# Patient Record
Sex: MALE | Race: WHITE | NOT HISPANIC OR LATINO
[De-identification: names, ages, dates, MRNs, and addresses within clinical notes are randomized per-mention and may not be internally consistent; named-entity substitution may affect disease eponyms.]

---

## 2017-08-13 PROBLEM — Z00.00 ENCOUNTER FOR PREVENTIVE HEALTH EXAMINATION: Status: ACTIVE | Noted: 2017-08-13

## 2017-08-17 ENCOUNTER — APPOINTMENT (OUTPATIENT)
Dept: VASCULAR SURGERY | Facility: CLINIC | Age: 72
End: 2017-08-17
Payer: COMMERCIAL

## 2017-08-17 VITALS
BODY MASS INDEX: 22.02 KG/M2 | TEMPERATURE: 97.5 F | HEIGHT: 66 IN | RESPIRATION RATE: 15 BRPM | OXYGEN SATURATION: 97 % | HEART RATE: 67 BPM | SYSTOLIC BLOOD PRESSURE: 158 MMHG | WEIGHT: 137 LBS | DIASTOLIC BLOOD PRESSURE: 88 MMHG

## 2017-08-17 DIAGNOSIS — I71.4 ABDOMINAL AORTIC ANEURYSM, W/OUT RUPTURE: ICD-10-CM

## 2017-08-17 DIAGNOSIS — Z86.79 PERSONAL HISTORY OF OTHER DISEASES OF THE CIRCULATORY SYSTEM: ICD-10-CM

## 2017-08-17 PROCEDURE — 99243 OFF/OP CNSLTJ NEW/EST LOW 30: CPT | Mod: 25

## 2017-08-17 PROCEDURE — 93923 UPR/LXTR ART STDY 3+ LVLS: CPT

## 2017-08-18 PROBLEM — I71.4 ABDOMINAL AORTIC ANEURYSM (AAA) WITHOUT RUPTURE: Status: ACTIVE | Noted: 2017-08-18

## 2017-08-18 RX ORDER — TAMSULOSIN HYDROCHLORIDE 0.4 MG/1
0.4 CAPSULE ORAL
Refills: 0 | Status: ACTIVE | COMMUNITY

## 2017-08-18 RX ORDER — ALLOPURINOL 300 MG/1
300 TABLET ORAL
Refills: 0 | Status: ACTIVE | COMMUNITY

## 2017-08-18 RX ORDER — METOPROLOL SUCCINATE 25 MG/1
25 TABLET, EXTENDED RELEASE ORAL
Refills: 0 | Status: ACTIVE | COMMUNITY

## 2017-08-18 RX ORDER — ASPIRIN ENTERIC COATED TABLETS 81 MG 81 MG/1
81 TABLET, DELAYED RELEASE ORAL
Refills: 0 | Status: ACTIVE | COMMUNITY

## 2017-08-18 RX ORDER — LISINOPRIL 5 MG/1
5 TABLET ORAL
Refills: 0 | Status: ACTIVE | COMMUNITY

## 2017-08-25 ENCOUNTER — OUTPATIENT (OUTPATIENT)
Dept: OUTPATIENT SERVICES | Facility: HOSPITAL | Age: 72
LOS: 1 days | End: 2017-08-25
Payer: COMMERCIAL

## 2017-08-25 VITALS
SYSTOLIC BLOOD PRESSURE: 157 MMHG | HEIGHT: 66 IN | DIASTOLIC BLOOD PRESSURE: 84 MMHG | RESPIRATION RATE: 16 BRPM | HEART RATE: 60 BPM | WEIGHT: 137.79 LBS | TEMPERATURE: 98 F

## 2017-08-25 DIAGNOSIS — Z01.818 ENCOUNTER FOR OTHER PREPROCEDURAL EXAMINATION: ICD-10-CM

## 2017-08-25 DIAGNOSIS — I25.10 ATHEROSCLEROTIC HEART DISEASE OF NATIVE CORONARY ARTERY WITHOUT ANGINA PECTORIS: ICD-10-CM

## 2017-08-25 DIAGNOSIS — Z98.890 OTHER SPECIFIED POSTPROCEDURAL STATES: Chronic | ICD-10-CM

## 2017-08-25 DIAGNOSIS — Z95.1 PRESENCE OF AORTOCORONARY BYPASS GRAFT: Chronic | ICD-10-CM

## 2017-08-25 DIAGNOSIS — I73.9 PERIPHERAL VASCULAR DISEASE, UNSPECIFIED: ICD-10-CM

## 2017-08-25 DIAGNOSIS — I10 ESSENTIAL (PRIMARY) HYPERTENSION: ICD-10-CM

## 2017-08-25 LAB
ANION GAP SERPL CALC-SCNC: 14 MMOL/L — SIGNIFICANT CHANGE UP (ref 5–17)
APTT BLD: 31.1 SEC — SIGNIFICANT CHANGE UP (ref 27.5–37.4)
BASOPHILS # BLD AUTO: 0 K/UL — SIGNIFICANT CHANGE UP (ref 0–0.2)
BASOPHILS NFR BLD AUTO: 0.3 % — SIGNIFICANT CHANGE UP (ref 0–2)
BLD GP AB SCN SERPL QL: SIGNIFICANT CHANGE UP
BUN SERPL-MCNC: 12 MG/DL — SIGNIFICANT CHANGE UP (ref 8–20)
CALCIUM SERPL-MCNC: 9.8 MG/DL — SIGNIFICANT CHANGE UP (ref 8.6–10.2)
CHLORIDE SERPL-SCNC: 102 MMOL/L — SIGNIFICANT CHANGE UP (ref 98–107)
CO2 SERPL-SCNC: 26 MMOL/L — SIGNIFICANT CHANGE UP (ref 22–29)
CREAT SERPL-MCNC: 0.88 MG/DL — SIGNIFICANT CHANGE UP (ref 0.5–1.3)
EOSINOPHIL # BLD AUTO: 0.1 K/UL — SIGNIFICANT CHANGE UP (ref 0–0.5)
EOSINOPHIL NFR BLD AUTO: 1.3 % — SIGNIFICANT CHANGE UP (ref 0–5)
GLUCOSE SERPL-MCNC: 93 MG/DL — SIGNIFICANT CHANGE UP (ref 70–115)
HCT VFR BLD CALC: 41.2 % — LOW (ref 42–52)
HGB BLD-MCNC: 13.4 G/DL — LOW (ref 14–18)
INR BLD: 1.07 RATIO — SIGNIFICANT CHANGE UP (ref 0.88–1.16)
LYMPHOCYTES # BLD AUTO: 2.4 K/UL — SIGNIFICANT CHANGE UP (ref 1–4.8)
LYMPHOCYTES # BLD AUTO: 26.6 % — SIGNIFICANT CHANGE UP (ref 20–55)
MCHC RBC-ENTMCNC: 31.6 PG — HIGH (ref 27–31)
MCHC RBC-ENTMCNC: 32.5 G/DL — SIGNIFICANT CHANGE UP (ref 32–36)
MCV RBC AUTO: 97.2 FL — HIGH (ref 80–94)
MONOCYTES # BLD AUTO: 0.6 K/UL — SIGNIFICANT CHANGE UP (ref 0–0.8)
MONOCYTES NFR BLD AUTO: 7.2 % — SIGNIFICANT CHANGE UP (ref 3–10)
NEUTROPHILS # BLD AUTO: 5.8 K/UL — SIGNIFICANT CHANGE UP (ref 1.8–8)
NEUTROPHILS NFR BLD AUTO: 64.4 % — SIGNIFICANT CHANGE UP (ref 37–73)
PLATELET # BLD AUTO: 267 K/UL — SIGNIFICANT CHANGE UP (ref 150–400)
POTASSIUM SERPL-MCNC: 5 MMOL/L — SIGNIFICANT CHANGE UP (ref 3.5–5.3)
POTASSIUM SERPL-SCNC: 5 MMOL/L — SIGNIFICANT CHANGE UP (ref 3.5–5.3)
PROTHROM AB SERPL-ACNC: 11.8 SEC — SIGNIFICANT CHANGE UP (ref 9.8–12.7)
RBC # BLD: 4.24 M/UL — LOW (ref 4.6–6.2)
RBC # FLD: 15.1 % — SIGNIFICANT CHANGE UP (ref 11–15.6)
SODIUM SERPL-SCNC: 142 MMOL/L — SIGNIFICANT CHANGE UP (ref 135–145)
TYPE + AB SCN PNL BLD: SIGNIFICANT CHANGE UP
WBC # BLD: 9 K/UL — SIGNIFICANT CHANGE UP (ref 4.8–10.8)
WBC # FLD AUTO: 9 K/UL — SIGNIFICANT CHANGE UP (ref 4.8–10.8)

## 2017-08-25 PROCEDURE — 85610 PROTHROMBIN TIME: CPT

## 2017-08-25 PROCEDURE — 86900 BLOOD TYPING SEROLOGIC ABO: CPT

## 2017-08-25 PROCEDURE — 80048 BASIC METABOLIC PNL TOTAL CA: CPT

## 2017-08-25 PROCEDURE — 86850 RBC ANTIBODY SCREEN: CPT

## 2017-08-25 PROCEDURE — 86901 BLOOD TYPING SEROLOGIC RH(D): CPT

## 2017-08-25 PROCEDURE — 85027 COMPLETE CBC AUTOMATED: CPT

## 2017-08-25 PROCEDURE — 85730 THROMBOPLASTIN TIME PARTIAL: CPT

## 2017-08-25 PROCEDURE — 36415 COLL VENOUS BLD VENIPUNCTURE: CPT

## 2017-08-25 RX ORDER — LISINOPRIL 2.5 MG/1
1 TABLET ORAL
Qty: 0 | Refills: 0 | COMMUNITY

## 2017-08-25 RX ORDER — SODIUM CHLORIDE 9 MG/ML
3 INJECTION INTRAMUSCULAR; INTRAVENOUS; SUBCUTANEOUS EVERY 8 HOURS
Qty: 0 | Refills: 0 | Status: DISCONTINUED | OUTPATIENT
Start: 2017-09-20 | End: 2017-09-20

## 2017-08-25 RX ORDER — CEFAZOLIN SODIUM 1 G
2000 VIAL (EA) INJECTION ONCE
Qty: 0 | Refills: 0 | Status: DISCONTINUED | OUTPATIENT
Start: 2017-09-20 | End: 2017-09-21

## 2017-08-25 NOTE — H&P PST ADULT - FAMILY HISTORY
Sibling  Still living? Yes, Estimated age: Age Unknown  Family history of diabetes mellitus, Age at diagnosis: Age Unknown     Sibling  Still living? No  Family history of emphysema, Age at diagnosis: 61-70     Mother  Still living? No  Family history of lung cancer, Age at diagnosis: Age Unknown

## 2017-08-25 NOTE — H&P PST ADULT - PROBLEM SELECTOR PLAN 2
Medical clearance pending. Take Metoprolol am day of surgery with small sip of water. Takes Lisinopril at night, take as normally would night before surgery.

## 2017-08-25 NOTE — H&P PST ADULT - PSH
History of lithotripsy    History of shoulder surgery  Left  S/P AAA repair    S/P CABG x 3  1990's at [facility]

## 2017-08-25 NOTE — H&P PST ADULT - PMH
BPH (benign prostatic hyperplasia)    Coronary artery disease    Hyperlipidemia    Hypertension    PVD (peripheral vascular disease)    Renal calculi AAA (abdominal aortic aneurysm)  s/p repair 2009  BPH (benign prostatic hyperplasia)    Coronary artery disease    Hyperlipidemia    Hypertension    PVD (peripheral vascular disease)    Renal calculi

## 2017-08-25 NOTE — H&P PST ADULT - NSANTHOSAYNRD_GEN_A_CORE
No. LATOYA screening performed.  STOP BANG Legend: 0-2 = LOW Risk; 3-4 = INTERMEDIATE Risk; 5-8 = HIGH Risk

## 2017-08-25 NOTE — H&P PST ADULT - PROBLEM SELECTOR PLAN 3
Cardiac clearance on chart. Hold Aspirin 5 days prior to surgery as ok with Dr. Bardales. Cardiac clearance on chart. Aspirin ok to be held for 5 days prior to surgery by cardiology, however Dr. Rodas said "ok to continue." Discussed with patient.

## 2017-08-25 NOTE — H&P PST ADULT - HISTORY OF PRESENT ILLNESS
71M with pain that starts in hip and radiates down left leg with numbness to toes and tingling down legs and weakness. Within 5 minutes of rest feeling comes back and tingling goes away. Patient reports this has been occurring for 2-3 months. For femoral femoral bypass.

## 2017-09-20 ENCOUNTER — INPATIENT (INPATIENT)
Facility: HOSPITAL | Age: 72
LOS: 0 days | Discharge: HOME | DRG: 253 | End: 2017-09-21
Attending: SURGERY | Admitting: SURGERY
Payer: COMMERCIAL

## 2017-09-20 VITALS
HEART RATE: 58 BPM | WEIGHT: 145.06 LBS | RESPIRATION RATE: 16 BRPM | HEIGHT: 66 IN | DIASTOLIC BLOOD PRESSURE: 70 MMHG | TEMPERATURE: 98 F | SYSTOLIC BLOOD PRESSURE: 158 MMHG | OXYGEN SATURATION: 99 %

## 2017-09-20 DIAGNOSIS — Z98.890 OTHER SPECIFIED POSTPROCEDURAL STATES: Chronic | ICD-10-CM

## 2017-09-20 DIAGNOSIS — Z95.1 PRESENCE OF AORTOCORONARY BYPASS GRAFT: Chronic | ICD-10-CM

## 2017-09-20 DIAGNOSIS — I73.9 PERIPHERAL VASCULAR DISEASE, UNSPECIFIED: ICD-10-CM

## 2017-09-20 LAB
BLD GP AB SCN SERPL QL: SIGNIFICANT CHANGE UP
TYPE + AB SCN PNL BLD: SIGNIFICANT CHANGE UP

## 2017-09-20 PROCEDURE — 35661 BPG FEMORAL-FEMORAL: CPT

## 2017-09-20 PROCEDURE — 93010 ELECTROCARDIOGRAM REPORT: CPT

## 2017-09-20 PROCEDURE — 35661 BPG FEMORAL-FEMORAL: CPT | Mod: AS

## 2017-09-20 RX ORDER — OXYCODONE HYDROCHLORIDE 5 MG/1
5 TABLET ORAL EVERY 6 HOURS
Qty: 0 | Refills: 0 | Status: DISCONTINUED | OUTPATIENT
Start: 2017-09-20 | End: 2017-09-21

## 2017-09-20 RX ORDER — ONDANSETRON 8 MG/1
4 TABLET, FILM COATED ORAL ONCE
Qty: 0 | Refills: 0 | Status: DISCONTINUED | OUTPATIENT
Start: 2017-09-20 | End: 2017-09-20

## 2017-09-20 RX ORDER — ALLOPURINOL 300 MG
300 TABLET ORAL DAILY
Qty: 0 | Refills: 0 | Status: DISCONTINUED | OUTPATIENT
Start: 2017-09-20 | End: 2017-09-21

## 2017-09-20 RX ORDER — LISINOPRIL 2.5 MG/1
5 TABLET ORAL DAILY
Qty: 0 | Refills: 0 | Status: DISCONTINUED | OUTPATIENT
Start: 2017-09-20 | End: 2017-09-21

## 2017-09-20 RX ORDER — ASPIRIN/CALCIUM CARB/MAGNESIUM 324 MG
81 TABLET ORAL
Qty: 0 | Refills: 0 | Status: DISCONTINUED | OUTPATIENT
Start: 2017-09-20 | End: 2017-09-21

## 2017-09-20 RX ORDER — ENOXAPARIN SODIUM 100 MG/ML
40 INJECTION SUBCUTANEOUS DAILY
Qty: 0 | Refills: 0 | Status: DISCONTINUED | OUTPATIENT
Start: 2017-09-20 | End: 2017-09-20

## 2017-09-20 RX ORDER — ALLOPURINOL 300 MG
1 TABLET ORAL
Qty: 0 | Refills: 0 | COMMUNITY

## 2017-09-20 RX ORDER — TAMSULOSIN HYDROCHLORIDE 0.4 MG/1
0.4 CAPSULE ORAL AT BEDTIME
Qty: 0 | Refills: 0 | Status: DISCONTINUED | OUTPATIENT
Start: 2017-09-20 | End: 2017-09-21

## 2017-09-20 RX ORDER — ASPIRIN/CALCIUM CARB/MAGNESIUM 324 MG
81 TABLET ORAL
Qty: 0 | Refills: 0 | Status: DISCONTINUED | OUTPATIENT
Start: 2017-09-20 | End: 2017-09-20

## 2017-09-20 RX ORDER — METOPROLOL TARTRATE 50 MG
25 TABLET ORAL DAILY
Qty: 0 | Refills: 0 | Status: DISCONTINUED | OUTPATIENT
Start: 2017-09-20 | End: 2017-09-21

## 2017-09-20 RX ORDER — MORPHINE SULFATE 50 MG/1
4 CAPSULE, EXTENDED RELEASE ORAL EVERY 4 HOURS
Qty: 0 | Refills: 0 | Status: DISCONTINUED | OUTPATIENT
Start: 2017-09-20 | End: 2017-09-21

## 2017-09-20 RX ORDER — SIMVASTATIN 20 MG/1
1 TABLET, FILM COATED ORAL
Qty: 0 | Refills: 0 | COMMUNITY

## 2017-09-20 RX ORDER — METOPROLOL TARTRATE 50 MG
25 TABLET ORAL DAILY
Qty: 0 | Refills: 0 | Status: DISCONTINUED | OUTPATIENT
Start: 2017-09-20 | End: 2017-09-20

## 2017-09-20 RX ORDER — SIMVASTATIN 20 MG/1
20 TABLET, FILM COATED ORAL AT BEDTIME
Qty: 0 | Refills: 0 | Status: DISCONTINUED | OUTPATIENT
Start: 2017-09-20 | End: 2017-09-21

## 2017-09-20 RX ORDER — ENOXAPARIN SODIUM 100 MG/ML
40 INJECTION SUBCUTANEOUS DAILY
Qty: 0 | Refills: 0 | Status: COMPLETED | OUTPATIENT
Start: 2017-09-21 | End: 2017-09-21

## 2017-09-20 RX ORDER — LISINOPRIL 2.5 MG/1
1 TABLET ORAL
Qty: 0 | Refills: 0 | COMMUNITY

## 2017-09-20 RX ORDER — SODIUM CHLORIDE 9 MG/ML
1000 INJECTION, SOLUTION INTRAVENOUS
Qty: 0 | Refills: 0 | Status: DISCONTINUED | OUTPATIENT
Start: 2017-09-20 | End: 2017-09-20

## 2017-09-20 RX ORDER — ASPIRIN/CALCIUM CARB/MAGNESIUM 324 MG
2 TABLET ORAL
Qty: 0 | Refills: 0 | COMMUNITY

## 2017-09-20 RX ORDER — TAMSULOSIN HYDROCHLORIDE 0.4 MG/1
1 CAPSULE ORAL
Qty: 0 | Refills: 0 | COMMUNITY

## 2017-09-20 RX ORDER — FENTANYL CITRATE 50 UG/ML
25 INJECTION INTRAVENOUS
Qty: 0 | Refills: 0 | Status: DISCONTINUED | OUTPATIENT
Start: 2017-09-20 | End: 2017-09-20

## 2017-09-20 RX ADMIN — FENTANYL CITRATE 25 MICROGRAM(S): 50 INJECTION INTRAVENOUS at 20:49

## 2017-09-20 RX ADMIN — SIMVASTATIN 20 MILLIGRAM(S): 20 TABLET, FILM COATED ORAL at 23:16

## 2017-09-20 RX ADMIN — FENTANYL CITRATE 25 MICROGRAM(S): 50 INJECTION INTRAVENOUS at 13:39

## 2017-09-20 RX ADMIN — FENTANYL CITRATE 25 MICROGRAM(S): 50 INJECTION INTRAVENOUS at 20:23

## 2017-09-20 RX ADMIN — TAMSULOSIN HYDROCHLORIDE 0.4 MILLIGRAM(S): 0.4 CAPSULE ORAL at 23:16

## 2017-09-20 RX ADMIN — SODIUM CHLORIDE 100 MILLILITER(S): 9 INJECTION, SOLUTION INTRAVENOUS at 20:24

## 2017-09-20 RX ADMIN — Medication 81 MILLIGRAM(S): at 23:16

## 2017-09-20 RX ADMIN — FENTANYL CITRATE 25 MICROGRAM(S): 50 INJECTION INTRAVENOUS at 20:20

## 2017-09-20 NOTE — PROGRESS NOTE ADULT - ASSESSMENT
71 year old male pod0 s/p Creation, bypass, arterial, femoral to popliteal, using ePTFE graft  - pain control prn  - EKG for bradycardia, monitor closely for symptomatic bradycardia, monitored bed  - monitor vital signs  - manning to stay for now  - vascular checks  - DVT ppx

## 2017-09-20 NOTE — BRIEF OPERATIVE NOTE - PROCEDURE
<<-----Click on this checkbox to enter Procedure Creation, bypass, arterial, femoral to popliteal, using ePTFE graft  09/20/2017  right to left  Active  SARINA Creation of bypass from femoral artery to contralateral femoral artery  09/21/2017  Right to left  Active  PDELOSSANT Creation of bypass from femoral artery to contralateral femoral artery  09/21/2017  Right to left  Active  RodasLiu Hall

## 2017-09-21 ENCOUNTER — TRANSCRIPTION ENCOUNTER (OUTPATIENT)
Age: 72
End: 2017-09-21

## 2017-09-21 VITALS
RESPIRATION RATE: 17 BRPM | TEMPERATURE: 98 F | DIASTOLIC BLOOD PRESSURE: 67 MMHG | OXYGEN SATURATION: 98 % | SYSTOLIC BLOOD PRESSURE: 114 MMHG | HEART RATE: 58 BPM

## 2017-09-21 DIAGNOSIS — I25.10 ATHEROSCLEROTIC HEART DISEASE OF NATIVE CORONARY ARTERY WITHOUT ANGINA PECTORIS: ICD-10-CM

## 2017-09-21 DIAGNOSIS — I10 ESSENTIAL (PRIMARY) HYPERTENSION: ICD-10-CM

## 2017-09-21 DIAGNOSIS — R00.1 BRADYCARDIA, UNSPECIFIED: ICD-10-CM

## 2017-09-21 LAB
ANION GAP SERPL CALC-SCNC: 9 MMOL/L — SIGNIFICANT CHANGE UP (ref 5–17)
APTT BLD: 29.8 SEC — SIGNIFICANT CHANGE UP (ref 27.5–37.4)
BASOPHILS # BLD AUTO: 0 K/UL — SIGNIFICANT CHANGE UP (ref 0–0.2)
BASOPHILS NFR BLD AUTO: 0.1 % — SIGNIFICANT CHANGE UP (ref 0–2)
BUN SERPL-MCNC: 17 MG/DL — SIGNIFICANT CHANGE UP (ref 8–20)
CALCIUM SERPL-MCNC: 8.5 MG/DL — LOW (ref 8.6–10.2)
CHLORIDE SERPL-SCNC: 106 MMOL/L — SIGNIFICANT CHANGE UP (ref 98–107)
CO2 SERPL-SCNC: 25 MMOL/L — SIGNIFICANT CHANGE UP (ref 22–29)
CREAT SERPL-MCNC: 0.87 MG/DL — SIGNIFICANT CHANGE UP (ref 0.5–1.3)
EOSINOPHIL # BLD AUTO: 0 K/UL — SIGNIFICANT CHANGE UP (ref 0–0.5)
EOSINOPHIL NFR BLD AUTO: 0 % — SIGNIFICANT CHANGE UP (ref 0–6)
GLUCOSE SERPL-MCNC: 133 MG/DL — HIGH (ref 70–115)
HCT VFR BLD CALC: 35.1 % — LOW (ref 42–52)
HGB BLD-MCNC: 11 G/DL — LOW (ref 14–18)
INR BLD: 1.16 RATIO — SIGNIFICANT CHANGE UP (ref 0.88–1.16)
LYMPHOCYTES # BLD AUTO: 1.2 K/UL — SIGNIFICANT CHANGE UP (ref 1–4.8)
LYMPHOCYTES # BLD AUTO: 11.3 % — LOW (ref 20–55)
MCHC RBC-ENTMCNC: 30.8 PG — SIGNIFICANT CHANGE UP (ref 27–31)
MCHC RBC-ENTMCNC: 31.3 G/DL — LOW (ref 32–36)
MCV RBC AUTO: 98.3 FL — HIGH (ref 80–94)
MONOCYTES # BLD AUTO: 0.8 K/UL — SIGNIFICANT CHANGE UP (ref 0–0.8)
MONOCYTES NFR BLD AUTO: 7.1 % — SIGNIFICANT CHANGE UP (ref 3–10)
NEUTROPHILS # BLD AUTO: 8.9 K/UL — HIGH (ref 1.8–8)
NEUTROPHILS NFR BLD AUTO: 81.3 % — HIGH (ref 37–73)
PLATELET # BLD AUTO: 242 K/UL — SIGNIFICANT CHANGE UP (ref 150–400)
POTASSIUM SERPL-MCNC: 5 MMOL/L — SIGNIFICANT CHANGE UP (ref 3.5–5.3)
POTASSIUM SERPL-SCNC: 5 MMOL/L — SIGNIFICANT CHANGE UP (ref 3.5–5.3)
PROTHROM AB SERPL-ACNC: 12.8 SEC — HIGH (ref 9.8–12.7)
RBC # BLD: 3.57 M/UL — LOW (ref 4.6–6.2)
RBC # FLD: 14.6 % — SIGNIFICANT CHANGE UP (ref 11–15.6)
SODIUM SERPL-SCNC: 140 MMOL/L — SIGNIFICANT CHANGE UP (ref 135–145)
WBC # BLD: 11 K/UL — HIGH (ref 4.8–10.8)
WBC # FLD AUTO: 11 K/UL — HIGH (ref 4.8–10.8)

## 2017-09-21 PROCEDURE — 86901 BLOOD TYPING SEROLOGIC RH(D): CPT

## 2017-09-21 PROCEDURE — 86900 BLOOD TYPING SEROLOGIC ABO: CPT

## 2017-09-21 PROCEDURE — 86850 RBC ANTIBODY SCREEN: CPT

## 2017-09-21 PROCEDURE — 85730 THROMBOPLASTIN TIME PARTIAL: CPT

## 2017-09-21 PROCEDURE — 86920 COMPATIBILITY TEST SPIN: CPT

## 2017-09-21 PROCEDURE — 93005 ELECTROCARDIOGRAM TRACING: CPT

## 2017-09-21 PROCEDURE — 80048 BASIC METABOLIC PNL TOTAL CA: CPT

## 2017-09-21 PROCEDURE — 36415 COLL VENOUS BLD VENIPUNCTURE: CPT

## 2017-09-21 PROCEDURE — 85027 COMPLETE CBC AUTOMATED: CPT

## 2017-09-21 PROCEDURE — 99223 1ST HOSP IP/OBS HIGH 75: CPT

## 2017-09-21 PROCEDURE — C1768: CPT

## 2017-09-21 PROCEDURE — 85610 PROTHROMBIN TIME: CPT

## 2017-09-21 RX ORDER — METOPROLOL TARTRATE 50 MG
1 TABLET ORAL
Qty: 0 | Refills: 0 | COMMUNITY

## 2017-09-21 RX ADMIN — ENOXAPARIN SODIUM 40 MILLIGRAM(S): 100 INJECTION SUBCUTANEOUS at 12:01

## 2017-09-21 RX ADMIN — Medication 300 MILLIGRAM(S): at 12:01

## 2017-09-21 RX ADMIN — Medication 81 MILLIGRAM(S): at 05:09

## 2017-09-21 NOTE — CONSULT NOTE ADULT - ASSESSMENT
71M with CAD s/p CABG, AAA s/p repair 2009, PVD POD # 1 for fem-fem bypass.  Overnight noted to be bradycardic (30s), asymptomatic.  Hemodynamically stable without any signs of hypoperfusion. Metoprolol was held.  Review of records show that he had an ILR implanted in 2015 for palpitations.  Since then reportedly had one episode of SVT, otherwise, occasional bradycardia.  Last interrogated 6/2017, no events. Denies chest pain, shortness of breath, palpitations, dizziness, syncope.  ECG without any new ischemic changes.     1. sinus bradycardia - likely underlying sick sinus syndrome, currently asymptomatic without any significant pauses noted on telemetry.  No tachyarrhythmias.  Hold metoprolol.  Implantable loop recorder already in place.  No further cardiac intervention warranted at this point.  PPM not indicated.  Will need close follow-up with outpatient cardiologist (Dr. Bardales).     May need PPM eventually if SVT re-curs requiring medical therapy or develops symptoms from bradycardia  2. PVD - s/p fem-fem bypass - aspirin/statin  3. CAD s/p CABG - stable, aspirin/statin, No BB due to bradycardia.    4. hypertension - well controlled, continue current regimen    Thank you for allowing me to participate in care of your patient.   Please call as needed.

## 2017-09-21 NOTE — CONSULT NOTE ADULT - SUBJECTIVE AND OBJECTIVE BOX
Patient is a 71y old  Male who presents with a chief complaint of Bypass to my legs (25 Aug 2017 09:27)      HPI: 71M with CAD s/p CABG, AAA s/p repair 2009, PVD POD # 1 for fem-fem bypass.  Overnight noted to be bradycardic (30s), asymptomatic.  Hemodynamically stable without any signs of hypoperfusion. Metoprolol was held.  Review of records show that he had an ILR implanted in 2015 for palpitations.  Since then reportedly had one episode of SVT, otherwise, occasional bradycardia.  Last interrogated 6/2017, no events. Denies chest pain, shortness of breath, palpitations, dizziness, syncope.      PAST MEDICAL & SURGICAL HISTORY:  AAA (abdominal aortic aneurysm): s/p repair 2009  Renal calculi  PVD (peripheral vascular disease)  Hyperlipidemia  BPH (benign prostatic hyperplasia)  Hypertension  Coronary artery disease  History of lithotripsy  History of shoulder surgery: Left  S/P AAA repair  S/P CABG x 3: 1990&#x27;s at [facility]    Allergies  No Known Allergies  Intolerances    MEDICATIONS  (STANDING):  ceFAZolin   IVPB 2000 milliGRAM(s) IV Intermittent once  allopurinol 300 milliGRAM(s) Oral daily  lisinopril 5 milliGRAM(s) Oral daily  simvastatin 20 milliGRAM(s) Oral at bedtime  tamsulosin 0.4 milliGRAM(s) Oral at bedtime  enoxaparin Injectable 40 milliGRAM(s) SubCutaneous daily  aspirin  chewable 81 milliGRAM(s) Oral two times a day    MEDICATIONS  (PRN):  morphine  - Injectable 4 milliGRAM(s) IV Push every 4 hours PRN Severe Pain  oxyCODONE    IR 5 milliGRAM(s) Oral every 6 hours PRN Moderate Pain      FAMILY HISTORY:  Family history of lung cancer (Mother)  Family history of emphysema (Sibling)  Family history of diabetes mellitus (Sibling)    SOCIAL HISTORY: former smoker, occasional etoh, no illicit drug use    PREVIOUS DIAGNOSTIC TESTING:  (ALL OUTPATIENT)    ECHO FINDINGS: 3/2017 Low normal LVEF 50% with rwma of the anteroseptal/anterior/anterolateral walls, no significant valvular disease, mild Aortic root dilation, moderate diastolic dysfunction  STRESS FINDINGS: 3/2017 pharm nuc no ischemia.  EF 60%  CATHETERIZATION FINDINGS: 1998 LVEF 30%, multivessel disease    REVIEW OF SYSTEMS:  CONSTITUTIONAL: No fever, weight loss, or fatigue  EYES: No eye pain, visual disturbances, or discharge  ENMT:  No difficulty hearing, tinnitus, vertigo; No sinus or throat pain  NECK: No pain or stiffness  RESPIRATORY: No cough, wheezing, chills or hemoptysis; No Shortness of Breath  CARDIOVASCULAR: No chest pain, palpitations, passing out, dizziness, or leg swelling, No PND or orthopnea; + claudication  GASTROINTESTINAL: No abdominal or epigastric pain. No nausea, vomiting, or hematemesis; No diarrhea or constipation. No melena or hematochezia.  GENITOURINARY: No dysuria, frequency, hematuria, or incontinence  NEUROLOGICAL: No headaches, memory loss, loss of strength, numbness, or tremors  SKIN: No itching, burning, rashes, or lesions   LYMPH Nodes: No enlarged glands  ENDOCRINE: No heat or cold intolerance; No hair loss  MUSCULOSKELETAL: No joint pain or swelling; No muscle, back, or extremity pain  PSYCHIATRIC: No depression, anxiety, mood swings, or difficulty sleeping  HEME/LYMPH: No easy bruising, or bleeding gums  ALLERY AND IMMUNOLOGIC: No hives or eczema	    Vital Signs Last 24 Hrs  T(C): 36.4 (21 Sep 2017 08:53), Max: 36.8 (20 Sep 2017 20:16)  T(F): 97.6 (21 Sep 2017 08:53), Max: 98.2 (20 Sep 2017 20:16)  HR: 48 (21 Sep 2017 08:53) (38 - 60)  BP: 106/57 (21 Sep 2017 08:53) (104/57 - 155/60)  BP(mean): 67 (20 Sep 2017 20:16) (67 - 67)  RR: 22 (21 Sep 2017 08:53) (11 - 23)  SpO2: 100% (21 Sep 2017 08:53) (98% - 100%)  Daily     Daily   I&O's Detail    20 Sep 2017 07:01  -  21 Sep 2017 07:00  --------------------------------------------------------  IN:    lactated ringers.: 500 mL  Total IN: 500 mL    OUT:    Indwelling Catheter - Urethral: 900 mL  Total OUT: 900 mL    Total NET: -400 mL    PHYSICAL EXAM:  Appearance: Normal, well nourished, NAD	  HEENT:   Normal oral mucosa, PERRL, EOMI, sclera non-icteric	  Lymphatic: No cervical lymphadenopathy  Cardiovascular: Normal S1 S2, No JVD, No cardiac murmurs, No carotid bruits, No peripheral edema  Respiratory: Lungs clear to auscultation	  Psychiatry: A & O x 3, Mood & affect appropriate  Gastrointestinal:  Soft, Non-tender, + BS, no bruits	  Skin: No rashes, No ecchymoses, No cyanosis, Dry  Neurologic: Grossly non-focal with full strength in all four extremities  Extremities: Normal range of motion, No clubbing, cyanosis or edema  Vascular: Peripheral pulses palpable  bilaterally, warm    INTERPRETATION OF TELEMETRY: sinus 30s-70s, no significant pauses    ECG (tracing reviewed by me): sinus bradycardia 38 bpm, RBBB, inferior infarct age indeterminate    LABS:                        11.0   11.0  )-----------( 242      ( 21 Sep 2017 05:32 )             35.1     09-21    140  |  106  |  17.0  ----------------------------<  133<H>  5.0   |  25.0  |  0.87    Ca    8.5<L>      21 Sep 2017 05:32    PT/INR - ( 21 Sep 2017 05:32 )   PT: 12.8 sec;   INR: 1.16 ratio    PTT - ( 21 Sep 2017 05:32 )  PTT:29.8 sec    I&O's Summary    20 Sep 2017 07:01  -  21 Sep 2017 07:00  --------------------------------------------------------  IN: 500 mL / OUT: 900 mL / NET: -400 mL    RADIOLOGY & ADDITIONAL STUDIES:  CXR (image reviewed by me):

## 2017-09-21 NOTE — DISCHARGE NOTE ADULT - CARE PROVIDERS DIRECT ADDRESSES
[email or web address],DirectAddress_Unknown

## 2017-09-21 NOTE — DISCHARGE NOTE ADULT - HOSPITAL COURSE
71M with pain that starts in hip and radiates down left leg with numbness to toes and tingling down legs and weakness. Within 5 minutes of rest feeling comes back and tingling goes away. Patient reports this has been occurring for 2-3 months. Found with sig PAD and presents for elective revascularization. The pt was taken to the OR on 9/21/17 by Dr Matt and is S/P R-L fem-fem bypass graft w/PTFE. Periop course was uneventful. He was monitored overnight on telemetry and noted with periods of bradycardia for which the pt was asymptomatic. Ekg performed did not reveal any high grade block and his BB was dc'd. POD#1 pt manning dc'd and he was voiding freely and OOB ambulating, He is stable for dc home

## 2017-09-21 NOTE — DISCHARGE NOTE ADULT - PATIENT PORTAL LINK FT
“You can access the FollowHealth Patient Portal, offered by [facility], by registering with the following website: [email or web address]”

## 2017-09-21 NOTE — DISCHARGE NOTE ADULT - CARE PLAN
Principal Discharge DX:	PVD (peripheral vascular disease)  Goal:	IMPROVE CIRCULATION AND FUNCTION  Instructions for follow-up, activity and diet:	May shower daily. Remove dressing prior. Wash incision with soap and water and pat dry. Leave open to air. No ointments, powders or creams to incision.  Monitor incision for any redness, swelling or drainage and call office immediately with any concerns. Follow up with Dr Issa in office in  1 week. Office will call with appointment. Office number [phone]

## 2017-09-21 NOTE — PROGRESS NOTE ADULT - SUBJECTIVE AND OBJECTIVE BOX
Pt seen, chart reviewed.  S/p Fem-Fem Bypass, POD#1.  VSS.  Adequate pain control.  Resting comfortably.   Tolerating PO intake.  No N/V.    No anesthesia problems noted.
INTERVAL HPI/OVERNIGHT EVENTS: Patient seen and examined as a post-op check. Patient resting comfortably in bed, offering no complaints. States pain is very well controlled with current pain regimen. Patient bradycardic to the 40s, asymptomatic with no cp, sob, dizziness, vision changes, nausea, sweating- states his HR always runs low. Remainder of vital signs are stable.     STATUS POST:  Creation, bypass, arterial, femoral to popliteal, using ePTFE graft      POST OPERATIVE DAY #: 0      MEDICATIONS  (STANDING):  ceFAZolin   IVPB 2000 milliGRAM(s) IV Intermittent once  allopurinol 300 milliGRAM(s) Oral daily  lisinopril 5 milliGRAM(s) Oral daily  simvastatin 20 milliGRAM(s) Oral at bedtime  tamsulosin 0.4 milliGRAM(s) Oral at bedtime  aspirin  chewable 81 milliGRAM(s) Oral two times a day  metoprolol 25 milliGRAM(s) Oral daily    MEDICATIONS  (PRN):  morphine  - Injectable 4 milliGRAM(s) IV Push every 4 hours PRN Severe Pain  oxyCODONE    IR 5 milliGRAM(s) Oral every 6 hours PRN Moderate Pain      Vital Signs Last 24 Hrs  T(C): 36.8 (20 Sep 2017 20:16), Max: 36.8 (20 Sep 2017 20:16)  T(F): 98.2 (20 Sep 2017 20:16), Max: 98.2 (20 Sep 2017 20:16)  HR: 48 (20 Sep 2017 20:47) (38 - 58)  BP: 118/48 (20 Sep 2017 20:47) (116/47 - 158/70)  BP(mean): 67 (20 Sep 2017 20:16) (67 - 67)  RR: 17 (20 Sep 2017 20:47) (11 - 22)  SpO2: 98% (20 Sep 2017 20:47) (98% - 100%)    PHYSICAL EXAM:      Constitutional: NAD  Eyes: EOMI  Respiratory: CTA b/l, no r/w/r, breathing comfortably on room air, satting 100%  Cardiovascular: s1, s2, bradycardic  Gastrointestinal: abdomen soft, ND, NT, no rebound, no guarding  Genitourinary: manning in place, functioning properly  Extremities: moving all extremities, no calf tenderness  Vascular: 2+ DP, PT, popliteal and femoral pulses b/l, +sensation b/l, + mobility b/l   Neurological: A&O x 3  Skin: warm and dry, no color changes          I&O's Detail    20 Sep 2017 07:01  -  20 Sep 2017 22:09  --------------------------------------------------------  IN:    lactated ringers.: 500 mL  Total IN: 500 mL    OUT:    Indwelling Catheter - Urethral: 250 mL  Total OUT: 250 mL    Total NET: 250 mL          LABS:                RADIOLOGY & ADDITIONAL STUDIES:
Patient is a 71y old  Male who presents with a chief complaint of Bypass to my legs (25 Aug 2017 09:27)    Pt is S/P   R-L fem-fem bypass graft w/PTFE        POD#   1  Noted with bradycardia overnight-asymptomatic  Has h/o bradycardia and follows with Dr Bardales  No complaints    Vital Signs Last 24 Hrs  T(C): 36.4 (21 Sep 2017 08:53), Max: 36.8 (20 Sep 2017 20:16)  T(F): 97.6 (21 Sep 2017 08:53), Max: 98.2 (20 Sep 2017 20:16)  HR: 54 (21 Sep 2017 11:00) (38 - 60)  BP: 106/57 (21 Sep 2017 08:53) (104/57 - 155/60)  BP(mean): 67 (20 Sep 2017 20:16) (67 - 67)  RR: 17 (21 Sep 2017 11:00) (11 - 23)  SpO2: 98% (21 Sep 2017 11:00) (98% - 100%)  I&O's Detail    20 Sep 2017 07:01  -  21 Sep 2017 07:00  --------------------------------------------------------  IN:    lactated ringers.: 500 mL  Total IN: 500 mL    OUT:    Indwelling Catheter - Urethral: 900 mL  Total OUT: 900 mL    Total NET: -400 mL    MEDICATIONS  (STANDING):  ceFAZolin   IVPB 2000 milliGRAM(s) IV Intermittent once  allopurinol 300 milliGRAM(s) Oral daily  lisinopril 5 milliGRAM(s) Oral daily  simvastatin 20 milliGRAM(s) Oral at bedtime  tamsulosin 0.4 milliGRAM(s) Oral at bedtime  enoxaparin Injectable 40 milliGRAM(s) SubCutaneous daily  aspirin  chewable 81 milliGRAM(s) Oral two times a day    MEDICATIONS  (PRN):  morphine  - Injectable 4 milliGRAM(s) IV Push every 4 hours PRN Severe Pain  oxyCODONE    IR 5 milliGRAM(s) Oral every 6 hours PRN Moderate Pain    PAST MEDICAL & SURGICAL HISTORY:  AAA (abdominal aortic aneurysm): s/p repair 2009  Renal calculi  PVD (peripheral vascular disease)  Hyperlipidemia  BPH (benign prostatic hyperplasia)  Hypertension  Coronary artery disease  History of lithotripsy  History of shoulder surgery: Left  S/P AAA repair  S/P CABG x 3: 1990&#x27;s at [facility]    Physical Exam:  General: NAD, resting comfortably in bed  Pulmonary: Nonlabored breathing, CTA  Cardiovascular: Normal S1, S2  Abdominal: soft, NT/ND  Extremities: BLE WWP, Bilat groin dressings removed and groins CDI with steris in place. No ecchymosis or hematomas appreciated  Pulses:   Right:                                                                          Left:  DP [ ]2+ [X ]1+ [ ]doppler                                                DP [X ]2+ [ ]1+ [ ]doppler  PT[ ]2+ [X ]1+ [ ]doppler                                                  PT [ ]2+ [X ]1+ [ ]doppler      LABS:                        11.0   11.0  )-----------( 242      ( 21 Sep 2017 05:32 )             35.1     09-21    140  |  106  |  17.0  ----------------------------<  133<H>  5.0   |  25.0  |  0.87    Ca    8.5<L>      21 Sep 2017 05:32      PT/INR - ( 21 Sep 2017 05:32 )   PT: 12.8 sec;   INR: 1.16 ratio    PTT - ( 21 Sep 2017 05:32 )  PTT:29.8 sec  CAPILLARY BLOOD GLUCOSE          Radiology and Additional Studies:    Assessment:71yMaleHPI:  71M with pain that starts in hip and radiates down left leg with numbness to toes and tingling down legs and weakness. Within 5 minutes of rest feeling comes back and tingling goes away. Patient reports this has been occurring for 2-3 months. For femoral femoral bypass. (25 Aug 2017 09:27)   S/P R-L fem-fem bypass graft w/PTFE   POD# 1  Periop bradycardia asymptomatic    Plan:  Cont ASA and statin  DC metoprolol  OOB/Ambulate/PT  DC manning  Home today if no changes and pt will follow up with his cardiologist next week  Seen and discussed with Dr. Matt

## 2017-09-21 NOTE — DISCHARGE NOTE ADULT - NS AS ACTIVITY OBS
Showering allowed/Walking-Indoors allowed/Do not drive or operate machinery/Walking-Outdoors allowed/Stairs allowed/No Heavy lifting/straining

## 2017-09-21 NOTE — DISCHARGE NOTE ADULT - CARE PROVIDER_API CALL
Liu Rodas), Vascular Surgery  [address]  [address]st [place] [address]  Phone: [phone]  Fax: [phone]    Teresa Bardales  [address] [place] [address]  Phone: [phone]  Fax: (   )    -

## 2017-09-21 NOTE — CHART NOTE - NSCHARTNOTEFT_GEN_A_CORE
Bradycardia discussed on the phone with on-call cardiologist Dr. Martin, EKG reviewed. Nothing to do as per Dr. Roblero as rhythm is sinus, no high grade AV bloc, patient is asymptomatic.

## 2017-09-21 NOTE — DISCHARGE NOTE ADULT - PROVIDER TOKENS
TOKEN:'[id number]',FREE:[LAST:[Catia],FIRST:[Teresa],PHONE:[phone]],FAX:[(   )    -],ADDRESS:[[address] [place] [address]]]

## 2017-09-21 NOTE — DISCHARGE NOTE ADULT - MEDICATION SUMMARY - MEDICATIONS TO TAKE
I will START or STAY ON the medications listed below when I get home from the hospital:    Vital 3  -- 3 drop(s) by mouth once a day  -- Indication: For SUPPLEMENT    aspirin 81 mg oral tablet  -- 2 tab(s) by mouth once a day  -- Indication: For PAD    Percocet 5/325 oral tablet  -- 1 tab(s) by mouth every 6 hours, As Needed MDD:4  -- Indication: For PAIN    lisinopril 5 mg oral tablet  -- 1 tab(s) by mouth once a day (at bedtime)  -- Indication: For HTN    tamsulosin 0.4 mg oral capsule  -- 1 cap(s) by mouth once a day  -- Indication: For BPH (benign prostatic hyperplasia)    allopurinol 300 mg oral tablet  -- 1 tab(s) by mouth once a day  -- Indication: For GOUT    simvastatin 20 mg oral tablet  -- 1 tab(s) by mouth once a day (at bedtime)  -- Indication: For HLD

## 2017-09-21 NOTE — DISCHARGE NOTE ADULT - PLAN OF CARE
IMPROVE CIRCULATION AND FUNCTION May shower daily. Remove dressing prior. Wash incision with soap and water and pat dry. Leave open to air. No ointments, powders or creams to incision.  Monitor incision for any redness, swelling or drainage and call office immediately with any concerns. Follow up with Dr Issa in office in  1 week. Office will call with appointment. Office number [phone]

## 2017-09-22 ENCOUNTER — TRANSCRIPTION ENCOUNTER (OUTPATIENT)
Age: 72
End: 2017-09-22

## 2017-10-02 ENCOUNTER — APPOINTMENT (OUTPATIENT)
Dept: VASCULAR SURGERY | Facility: CLINIC | Age: 72
End: 2017-10-02
Payer: COMMERCIAL

## 2017-10-02 VITALS
OXYGEN SATURATION: 97 % | DIASTOLIC BLOOD PRESSURE: 82 MMHG | SYSTOLIC BLOOD PRESSURE: 137 MMHG | TEMPERATURE: 98.9 F | HEIGHT: 66 IN | WEIGHT: 134 LBS | BODY MASS INDEX: 21.53 KG/M2 | HEART RATE: 67 BPM | RESPIRATION RATE: 15 BRPM

## 2017-10-02 PROCEDURE — 99024 POSTOP FOLLOW-UP VISIT: CPT

## 2018-01-08 ENCOUNTER — APPOINTMENT (OUTPATIENT)
Dept: VASCULAR SURGERY | Facility: CLINIC | Age: 73
End: 2018-01-08
Payer: COMMERCIAL

## 2018-01-08 VITALS
HEART RATE: 68 BPM | TEMPERATURE: 98 F | WEIGHT: 135 LBS | RESPIRATION RATE: 15 BRPM | OXYGEN SATURATION: 97 % | BODY MASS INDEX: 21.69 KG/M2 | DIASTOLIC BLOOD PRESSURE: 93 MMHG | HEIGHT: 66 IN | SYSTOLIC BLOOD PRESSURE: 158 MMHG

## 2018-01-08 DIAGNOSIS — I73.9 PERIPHERAL VASCULAR DISEASE, UNSPECIFIED: ICD-10-CM

## 2018-01-08 PROCEDURE — 99214 OFFICE O/P EST MOD 30 MIN: CPT

## 2018-01-08 PROCEDURE — 93926 LOWER EXTREMITY STUDY: CPT

## 2018-01-09 PROBLEM — I73.9 LEFT LEG CLAUDICATION: Status: ACTIVE | Noted: 2017-08-17

## 2018-07-09 ENCOUNTER — APPOINTMENT (OUTPATIENT)
Dept: VASCULAR SURGERY | Facility: CLINIC | Age: 73
End: 2018-07-09
Payer: MEDICARE

## 2018-07-09 ENCOUNTER — APPOINTMENT (OUTPATIENT)
Dept: VASCULAR SURGERY | Facility: CLINIC | Age: 73
End: 2018-07-09
Payer: COMMERCIAL

## 2018-07-09 VITALS
BODY MASS INDEX: 20.82 KG/M2 | SYSTOLIC BLOOD PRESSURE: 150 MMHG | HEART RATE: 71 BPM | TEMPERATURE: 98.1 F | OXYGEN SATURATION: 96 % | DIASTOLIC BLOOD PRESSURE: 89 MMHG | WEIGHT: 129 LBS

## 2018-07-09 DIAGNOSIS — I65.21 OCCLUSION AND STENOSIS OF RIGHT CAROTID ARTERY: ICD-10-CM

## 2018-07-09 DIAGNOSIS — Z95.828 PRESENCE OF OTHER VASCULAR IMPLANTS AND GRAFTS: ICD-10-CM

## 2018-07-09 DIAGNOSIS — F17.210 NICOTINE DEPENDENCE, CIGARETTES, UNCOMPLICATED: ICD-10-CM

## 2018-07-09 PROCEDURE — 93880 EXTRACRANIAL BILAT STUDY: CPT

## 2018-07-09 PROCEDURE — 99214 OFFICE O/P EST MOD 30 MIN: CPT

## 2018-07-09 PROCEDURE — 93926 LOWER EXTREMITY STUDY: CPT

## 2018-07-10 PROBLEM — Z95.828 H/O ENDOVASCULAR STENT GRAFT FOR ABDOMINAL AORTIC ANEURYSM: Status: ACTIVE | Noted: 2017-08-18

## 2018-07-10 PROBLEM — I65.21 ASYMPTOMATIC STENOSIS OF RIGHT CAROTID ARTERY: Status: ACTIVE | Noted: 2018-07-10

## 2018-07-10 PROBLEM — Z95.828 S/P FEMORAL-FEMORAL BYPASS SURGERY: Status: ACTIVE | Noted: 2018-01-08

## 2021-12-15 NOTE — PATIENT PROFILE ADULT. - DOES PATIENT HAVE ADVANCE DIRECTIVE
Report received from Ros[place], [facility]. Patient in bed resting. Respirations even and unlabored. On 4L NC. All needs addressed. Safety measures in place. Call light in reach. No signs of acute distress at this time. Will continue to monitor. No

## 2022-10-14 PROBLEM — I10 ESSENTIAL (PRIMARY) HYPERTENSION: Chronic | Status: ACTIVE | Noted: 2017-08-25

## 2022-10-14 PROBLEM — I25.10 ATHEROSCLEROTIC HEART DISEASE OF NATIVE CORONARY ARTERY WITHOUT ANGINA PECTORIS: Chronic | Status: ACTIVE | Noted: 2017-08-25

## 2022-10-14 PROBLEM — N40.0 BENIGN PROSTATIC HYPERPLASIA WITHOUT LOWER URINARY TRACT SYMPTOMS: Chronic | Status: ACTIVE | Noted: 2017-08-25

## 2022-10-14 PROBLEM — E78.5 HYPERLIPIDEMIA, UNSPECIFIED: Chronic | Status: ACTIVE | Noted: 2017-08-25

## 2022-10-14 PROBLEM — N20.0 CALCULUS OF KIDNEY: Chronic | Status: ACTIVE | Noted: 2017-08-25

## 2022-10-14 PROBLEM — I73.9 PERIPHERAL VASCULAR DISEASE, UNSPECIFIED: Chronic | Status: ACTIVE | Noted: 2017-08-25

## 2022-10-14 PROBLEM — I71.4 ABDOMINAL AORTIC ANEURYSM, WITHOUT RUPTURE: Chronic | Status: ACTIVE | Noted: 2017-08-25

## 2022-10-19 ENCOUNTER — APPOINTMENT (OUTPATIENT)
Dept: PULMONOLOGY | Facility: CLINIC | Age: 77
End: 2022-10-19

## 2022-10-19 VITALS
HEIGHT: 66 IN | HEART RATE: 68 BPM | OXYGEN SATURATION: 95 % | BODY MASS INDEX: 18.8 KG/M2 | WEIGHT: 117 LBS | TEMPERATURE: 97.2 F | SYSTOLIC BLOOD PRESSURE: 170 MMHG | DIASTOLIC BLOOD PRESSURE: 91 MMHG

## 2022-10-19 PROCEDURE — 99205 OFFICE O/P NEW HI 60 MIN: CPT

## 2022-10-19 RX ORDER — TIOTROPIUM BROMIDE AND OLODATEROL 3.124; 2.736 UG/1; UG/1
2.5-2.5 SPRAY, METERED RESPIRATORY (INHALATION)
Qty: 1 | Refills: 3 | Status: ACTIVE | COMMUNITY
Start: 2022-10-19 | End: 1900-01-01

## 2022-10-19 NOTE — CONSULT LETTER
[Dear  ___] : Dear  [unfilled], [FreeTextEntry1] : I had the pleasure of evaluating your patient, JUNIOR PAUL , in the office today.  Please review my consultation and evaluation report that follows below.  Please do not hesitate to call me if further information is necessary or if you wish to discuss ongoing care or diagnostic work-up.   \par I very much appreciate your referral and it is a privilege to be able to provide care for your patient.\par \par Sincerely,\par  \par Ted Barr MD, MHCM, FACP, BARRY-C\par Pulmonary Medicine\par  of Medicine\par [employer] [facility]\par [email or web address]\par \par [facility] in [place]\par [address]\[place]  [address]\par    Fax \par \par [facility] [place]\par [address]\par [place], [place] \par \par

## 2022-10-19 NOTE — PHYSICAL EXAM
[No Acute Distress] : no acute distress [Normal Oropharynx] : normal oropharynx [Normal Appearance] : normal appearance [No Neck Mass] : no neck mass [Normal Rate/Rhythm] : normal rate/rhythm [Normal S1, S2] : normal s1, s2 [No Murmurs] : no murmurs [No Resp Distress] : no resp distress [Clear to Auscultation Bilaterally] : clear to auscultation bilaterally [No Abnormalities] : no abnormalities [Benign] : benign [Normal Gait] : normal gait [No Clubbing] : no clubbing [No Cyanosis] : no cyanosis [No Edema] : no edema [FROM] : FROM [Normal Color/ Pigmentation] : normal color/ pigmentation [No Focal Deficits] : no focal deficits [Oriented x3] : oriented x3 [Normal Affect] : normal affect [TextBox_2] : Cachectic, breathless at rest O2 sat 94% at rest RA

## 2022-10-19 NOTE — HISTORY OF PRESENT ILLNESS
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

## 2022-10-19 NOTE — ASSESSMENT
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\par Progressive dyspnea on exertion, says he has lost weight\par Dyspnea upon walking 50 feet or steps\par Losing muscle mass\par Difficulty in lying down and sleeping\par  Siginificant vascular history:\par s/p CABG 20+ yrs ago\par s/p AICD and PPM in last year\par Treated with Entresto for about two years\par \par Dr Meza is cardiologist\par s/p AAA stening\par s/p fem fem bypass 4 years ago\par Meds Entresto, Metoprolol, ASA plavix, statin, allopurinol Tamsulosin\par No allergies\par \par Imp\par 77 yo man with severe cardiovascular disease, CAD, aortic, periph vasc and carotid\par s/p CAB, AICD, PPM\par Treated for low EF with Entresto\par Longterm and current smoker\par Likely severe COPD, emphysema with documented exercise hypoxemia and probable nocturnal hypoxemia\par Patient strongly advised to stop smoking NOW\par Nasal oxygen with exercise and at night\par Monitor oxygen at home\par Begin stiolto and albuterol nebs PRN\par CXR for now ]\par Arrange PFTs and F/u

## 2022-10-19 NOTE — PROCEDURE
[FreeTextEntry1] : 02sat RA rest 94%\par 02sat RA exercise 100+ ft 84% with HR remaining 70\par 02sat Nasal 02 2 lpm exercise  91%

## 2023-02-03 ENCOUNTER — APPOINTMENT (OUTPATIENT)
Dept: PULMONOLOGY | Facility: CLINIC | Age: 78
End: 2023-02-03
Payer: MEDICARE

## 2023-02-03 VITALS — WEIGHT: 120 LBS | BODY MASS INDEX: 19.99 KG/M2 | HEIGHT: 65 IN

## 2023-02-03 VITALS — RESPIRATION RATE: 16 BRPM | DIASTOLIC BLOOD PRESSURE: 82 MMHG | SYSTOLIC BLOOD PRESSURE: 124 MMHG

## 2023-02-03 VITALS — HEART RATE: 84 BPM | OXYGEN SATURATION: 92 %

## 2023-02-03 PROCEDURE — 85018 HEMOGLOBIN: CPT | Mod: QW

## 2023-02-03 PROCEDURE — 99215 OFFICE O/P EST HI 40 MIN: CPT | Mod: 25

## 2023-02-03 PROCEDURE — 94010 BREATHING CAPACITY TEST: CPT

## 2023-02-03 PROCEDURE — 94727 GAS DIL/WSHOT DETER LNG VOL: CPT

## 2023-02-03 PROCEDURE — 94729 DIFFUSING CAPACITY: CPT

## 2023-02-03 NOTE — PROCEDURE
[FreeTextEntry1] : 02 sat RA 92%\par 02 sat RA exercise  86%\par 02 sat exercise with 2 LPM 90%\par \par \par PFTs:\par Severe obstruction with suggestion of emphysema\par FEV1 0.86\par FEV1% 39%\par Midexpir flow 17%\par DLCO 33%

## 2023-02-03 NOTE — ASSESSMENT
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 \par  \par Interim:\par Patient has stopped smoking!!!\par He does feel better  and has gained about 5 pounds\par BUT, still severe fatigue and HULL Doesnt do much \par Using oxygen with exercise\par Not too much at rest during day tho\par \par CXR showed COPD, hyperinflation\par \par 02 sat RA 92%\par 02 sat RA exercise  86%\par 02 sat exercise with 2 LPM 90%\par \par \par PFTs:\par Severe obstruction with suggestion of emphysema\par FEV1 0.86\par FEV1% 39%\par Midexpir flow 17%\par DLCO 33%\par \par Imp\par 78 yo man with CAD, s/p CABG, ICD and PPM on entresto\par Longterm smoker who just stopped\par 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

## 2023-02-03 NOTE — HISTORY OF PRESENT ILLNESS
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 \par  \par Interim:\par Patient has stopped smoking!!!\par He does feel better  and has gained about 5 pounds\par BUT, still severe fatigue and HULL Doesnt do much \par Using oxygen with exercise\par Not too much at rest during day tho\par \par CXR showed COPD, hyperinflation\par Here for PFTs

## 2023-02-03 NOTE — CONSULT LETTER
[Dear  ___] : Dear  [unfilled], [FreeTextEntry1] : I had the pleasure of evaluating your patient, JUNIOR PAUL , in the office today.  Please review my consultation and evaluation report that follows below.  Please do not hesitate to call me if further information is necessary or if you wish to discuss ongoing care or diagnostic work-up.   \par I very much appreciate your referral and it is a privilege to be able to provide care for your patient.\par \par Sincerely,\par  \par Ted Barr MD, MHCM, FACP, BARRY-C\par Pulmonary Medicine\par  of Medicine\par [facility] at [facility] [place]/[facility]\par [email or web address]\par \par [facility] in [place]\par [address]\[place]  [address]\par    Fax \par \par [facility] [place]\par [address]\par [place], [place] \par \par

## 2023-04-28 RX ORDER — TIOTROPIUM BROMIDE AND OLODATEROL 3.124; 2.736 UG/1; UG/1
2.5-2.5 SPRAY, METERED RESPIRATORY (INHALATION)
Qty: 1 | Refills: 3 | Status: ACTIVE | COMMUNITY
Start: 2023-02-03 | End: 1900-01-01

## 2023-04-28 RX ORDER — ALBUTEROL SULFATE 90 UG/1
108 (90 BASE) INHALANT RESPIRATORY (INHALATION)
Qty: 1 | Refills: 3 | Status: ACTIVE | COMMUNITY
Start: 2022-11-18 | End: 1900-01-01

## 2023-05-09 ENCOUNTER — APPOINTMENT (OUTPATIENT)
Dept: PULMONOLOGY | Facility: CLINIC | Age: 78
End: 2023-05-09

## 2023-05-16 ENCOUNTER — APPOINTMENT (OUTPATIENT)
Dept: PULMONOLOGY | Facility: CLINIC | Age: 78
End: 2023-05-16
Payer: MEDICARE

## 2023-05-16 VITALS
TEMPERATURE: 97.3 F | BODY MASS INDEX: 19.99 KG/M2 | HEART RATE: 72 BPM | WEIGHT: 120 LBS | SYSTOLIC BLOOD PRESSURE: 133 MMHG | HEIGHT: 65 IN | DIASTOLIC BLOOD PRESSURE: 79 MMHG | OXYGEN SATURATION: 97 %

## 2023-05-16 PROCEDURE — 99214 OFFICE O/P EST MOD 30 MIN: CPT

## 2023-05-16 NOTE — ASSESSMENT
[FreeTextEntry1] : 75 yo man with COPD\par Last here in Feb 2023\par Smoker in past and still says that he smokes a few--suspect it is a bit more tho\par s/p CABG 20+ yrs ago\par s/p AICD and PPM in last year\par Treated with Entresto for about two years\par Dr Meza is cardiologist\par s/p AAA stening\par s/p fem fem bypass 4 years ago\par Meds Entresto, Metoprolol, ASA plavix, statin, allopurinol Tamsulosin\par No allergies. \par \par PFTs showed severe obstruction\par Talking stiolto, probably regularly and uses albuterol MDI or nebs several times a day\par \par He does not use oxygen at all during activity\par He does use it occasionally at rest, never at night\par \par Imp\par 75 yo man with advance COPD, exercise hypoxemia\par Underlying CAD, s/p CABG  ICD and PPM on entresto\par Still sneaking cigarettes\par Encouraged to use oxygen during exercise--long discussion with him and wife\par Encouraged not to smoke at all\par Rec maintain meds and RTC 3 months

## 2023-05-16 NOTE — HISTORY OF PRESENT ILLNESS
[TextBox_4] : 77 yo man with COPD\par Last here in Feb 2023\par Smoker in past and still says that he smokes a few--suspect it is a bit more tho\par s/p CABG 20+ yrs ago\par s/p AICD and PPM in last year\par Treated with Entresto for about two years\par Dr Meza is cardiologist\par s/p AAA stening\par s/p fem fem bypass 4 years ago\par Meds Entresto, Metoprolol, ASA plavix, statin, allopurinol Tamsulosin\par No allergies. \par \par PFTs showed severe obstruction\par Talking stiolto, probably regularly and uses albuterol MDI or nebs several times a day\par \par He does not use oxygen at all during activity\par He does use it occasionally at rest, never at night

## 2023-08-29 ENCOUNTER — APPOINTMENT (OUTPATIENT)
Dept: PULMONOLOGY | Facility: CLINIC | Age: 78
End: 2023-08-29
Payer: MEDICARE

## 2023-08-29 VITALS
RESPIRATION RATE: 18 BRPM | HEIGHT: 65 IN | BODY MASS INDEX: 20.16 KG/M2 | HEART RATE: 64 BPM | DIASTOLIC BLOOD PRESSURE: 87 MMHG | WEIGHT: 121 LBS | SYSTOLIC BLOOD PRESSURE: 176 MMHG | OXYGEN SATURATION: 94 % | TEMPERATURE: 97.7 F

## 2023-08-29 PROCEDURE — 99214 OFFICE O/P EST MOD 30 MIN: CPT

## 2023-09-01 NOTE — ASSESSMENT
[FreeTextEntry1] : 78 yo man with COPD , last here in May 23 s/pCABG , AICD and PPM, s/p AAA stenting, s/p femfem bypass On Entresto, metoprolol asa plavix, statin atorvastatin, allopurinol tamsulosin  Takes stiolto regularly, using albuterol vis nebs at least twice a day Again, comes in without oxygen 02sat RA rest 94%  Imp 78 yo man with cardiovascular disease primarily He has baseline COPD but appears to be doing well on Stiolto  Would recommend regular use of albuterol via nebs at least twice a day RTC 3 months

## 2023-09-01 NOTE — CONSULT LETTER
[Dear  ___] : Dear  [unfilled], [FreeTextEntry1] : I had the pleasure of evaluating your patient, JUNIOR PAUL , in the office today.  Please review my consultation and evaluation report that follows below.  Please do not hesitate to call me if further information is necessary or if you wish to discuss ongoing care or diagnostic work-up.    I very much appreciate your referral and it is a privilege to be able to provide care for your patient.  Sincerely,   Ted Barr MD, MHCM, FACP, BARRY-C Pulmonary Medicine  of Medicine [facility] at [facility] [email or web address] in [address] [place]  [address]    Fax   [facility] at [address]  [phone]

## 2023-09-01 NOTE — HISTORY OF PRESENT ILLNESS
[TextBox_4] : 78 yo man with COPD , last here in May 23 s/pCABG , AICD and PPM, s/p AAA stenting, s/p femfem bypass On Entresto, metoprolol asa plavix, statin atorvastatin, allopurinol tamsulosin  Takes stiolto regularly, using albuterol vis nebs at least twice a day Again, comes in without oxygen 02sat RA rest 94%

## 2023-10-23 RX ORDER — TIOTROPIUM BROMIDE AND OLODATEROL 3.124; 2.736 UG/1; UG/1
2.5-2.5 SPRAY, METERED RESPIRATORY (INHALATION)
Qty: 3 | Refills: 3 | Status: ACTIVE | COMMUNITY
Start: 2023-10-23 | End: 1900-01-01

## 2023-11-29 ENCOUNTER — APPOINTMENT (OUTPATIENT)
Dept: PULMONOLOGY | Facility: CLINIC | Age: 78
End: 2023-11-29
Payer: MEDICARE

## 2023-11-29 VITALS
RESPIRATION RATE: 18 BRPM | SYSTOLIC BLOOD PRESSURE: 135 MMHG | DIASTOLIC BLOOD PRESSURE: 82 MMHG | OXYGEN SATURATION: 92 % | HEIGHT: 65 IN | TEMPERATURE: 97.2 F | HEART RATE: 80 BPM

## 2023-11-29 DIAGNOSIS — R09.02 HYPOXEMIA: ICD-10-CM

## 2023-11-29 PROCEDURE — 99213 OFFICE O/P EST LOW 20 MIN: CPT

## 2024-02-28 RX ORDER — ALBUTEROL SULFATE 90 UG/1
108 (90 BASE) INHALANT RESPIRATORY (INHALATION)
Qty: 3 | Refills: 2 | Status: ACTIVE | COMMUNITY
Start: 2023-08-29 | End: 1900-01-01

## 2024-02-28 RX ORDER — ALBUTEROL SULFATE 2.5 MG/3ML
(2.5 MG/3ML) SOLUTION RESPIRATORY (INHALATION)
Qty: 1 | Refills: 3 | Status: ACTIVE | COMMUNITY
Start: 2022-10-19 | End: 1900-01-01

## 2024-02-28 RX ORDER — FLUTICASONE PROPIONATE 50 UG/1
50 SPRAY, METERED NASAL TWICE DAILY
Qty: 1 | Refills: 4 | Status: ACTIVE | COMMUNITY
Start: 2023-05-16 | End: 1900-01-01

## 2024-03-26 ENCOUNTER — APPOINTMENT (OUTPATIENT)
Dept: PULMONOLOGY | Facility: CLINIC | Age: 79
End: 2024-03-26
Payer: MEDICARE

## 2024-03-26 VITALS
WEIGHT: 120 LBS | SYSTOLIC BLOOD PRESSURE: 133 MMHG | DIASTOLIC BLOOD PRESSURE: 76 MMHG | HEIGHT: 65 IN | BODY MASS INDEX: 19.99 KG/M2 | HEART RATE: 70 BPM | TEMPERATURE: 96.3 F | OXYGEN SATURATION: 95 %

## 2024-03-26 DIAGNOSIS — J44.9 CHRONIC OBSTRUCTIVE PULMONARY DISEASE, UNSPECIFIED: ICD-10-CM

## 2024-03-26 PROCEDURE — 99214 OFFICE O/P EST MOD 30 MIN: CPT

## 2024-03-26 NOTE — HISTORY OF PRESENT ILLNESS
[TextBox_4] : 79 yo man with COPD , last here in May 23 s/pCABG , AICD and PPM, s/p AAA stenting, s/p femfem bypass On Entresto, metoprolol asa plavix, statin atorvastatin, allopurinol tamsulosin Takes stiolto regularly, using albuterol vis nebs at least twice a day Again, comes in without oxygen Today 02sat RA rest is 95% He says he does not check his 02 sats at home Using stiolto daily regularly Uses albuterol nebs but only once a day He says he uses his oxygen PRN at home--but, as above doesnt check his sats All in all , he appears to remain  stable , followed by cardiology--Dr Meza

## 2024-03-26 NOTE — ASSESSMENT
[FreeTextEntry1] : 79 yo man with COPD , last here in May 23 s/pCABG , AICD and PPM, s/p AAA stenting, s/p femfem bypass On Entresto, metoprolol asa plavix, statin atorvastatin, allopurinol tamsulosin Takes stiolto regularly, using albuterol vis nebs at least twice a day Again, comes in without oxygen Today 02sat RA rest is 95% He says he does not check his 02 sats at home Using stiolto daily regularly Uses albuterol nebs but only once a day He says he uses his oxygen PRN at home--but, as above doesnt check his sats All in all , he appears to remain  stable , followed by cardiology--Dr Meza  Imp 79 yo man with COPD on stiolto Has been hypoxemic with exercise in past At rest , appears to be adequately sturated Signficant underlying cardiac disease which appears to be stable at this time Will maintain stiolto and patient should  continue to monitor his oxygenation

## 2024-06-27 NOTE — ASU PREOP CHECKLIST - NOTHING BY MOUTH SINCE
"History  Chief Complaint   Patient presents with    Cold Like Symptoms     Pt arrived via EMS. Pt reports coughing, sneezing, congestion, and \"lots of clearing my throat\" for the past three days. Tonight, pt woke up with chest tightness. Pt also c/o headache, diarrhea and nausea. Denies SOB, vomiting,and dizziness. A &O x3     76-year-old male with history of hiatal hernia, hypertension, type 2 diabetes, stroke presents to ED for evaluation of shortness of breath, cough, nasal congestion, chest tightness, chest pain.  Patient states that prior to arrival to ED he woke up and had chest tightness.  Was unable to breathe.  Reports that he was diaphoretic during chest tightness episode.  Has been having respiratory symptoms for the past couple days.  The chest pain is new for patient.  Chest pain has improved since initial onset.  Still having residual chest pain in the ED.  The cough has been nonproductive.  The nasal congestion has been very bothersome to patient.  No known sick contacts.  Denies fever, chills, nausea, vomiting, numbness and tingling of extremities, speech difficulty, facial droop, visual disturbances.         Prior to Admission Medications   Prescriptions Last Dose Informant Patient Reported? Taking?   Empagliflozin (JARDIANCE) 10 MG TABS tablet  Outside Facility (Specify) Yes No   Sig: Take 10 mg by mouth every morning   Ergocalciferol (VITAMIN D2 PO)  Outside Facility (Specify) Yes No   Sig: Take 50,000 Units by mouth once a week   acetaminophen (TYLENOL) 325 mg tablet  Outside Facility (Specify) No No   Sig: Take 2 tablets (650 mg total) by mouth every 6 (six) hours as needed for mild pain   acetaminophen (TYLENOL) 325 mg tablet  Outside Facility (Specify) No No   Sig: Take 3 tablets (975 mg total) by mouth every 8 (eight) hours   albuterol (2.5 mg/3 mL) 0.083 % nebulizer solution  Outside Facility (Specify) No No   Sig: Take 3 mL (2.5 mg total) by nebulization every 6 (six) hours as needed for " wheezing or shortness of breath   amLODIPine-atorvastatin (CADUET) 10-80 MG per tablet  Outside Facility (Specify) Yes No   Sig: Take 1 tablet by mouth daily   baclofen 10 mg tablet  Outside Facility (Specify) Yes No   Sig: Take 5 mg by mouth 3 (three) times a day   bisacodyl (DULCOLAX) 10 mg suppository  Outside Facility (Specify) Yes No   Sig: Insert 10 mg into the rectum daily as needed for constipation   budesonide-formoterol (SYMBICORT) 160-4.5 mcg/act inhaler  Outside Facility (Specify) Yes No   Sig: Inhale 2 puffs 2 (two) times a day Rinse mouth after use.   clopidogrel (PLAVIX) 75 mg tablet  Outside Facility (Specify) No No   Sig: Take 1 tablet (75 mg total) by mouth daily   cromolyn (NASALCHROM) 5.2 MG/ACT nasal spray   No No   Si spray into each nostril 3 (three) times a day   cyanocobalamin (VITAMIN B-12) 500 MCG tablet  Outside Facility (Specify) No No   Sig: Take 1 tablet (500 mcg total) by mouth daily   gabapentin (NEURONTIN) 300 mg capsule  Outside Facility (Specify) No No   Sig: Take 1 capsule (300 mg total) by mouth 2 (two) times a day   gabapentin (NEURONTIN) 300 mg capsule  Outside Facility (Specify) No No   Sig: Take 2 capsules (600 mg total) by mouth daily at bedtime   latanoprost (XALATAN) 0.005 % ophthalmic solution  Outside Facility (Specify) Yes No   Sig: Administer 1 drop to both eyes daily at bedtime   lidocaine (LIDODERM) 5 %  Outside Facility (Specify) No No   Sig: Apply 1 patch topically over 12 hours daily Remove & Discard patch within 12 hours or as directed by MD   melatonin 3 mg  Outside Facility (Specify) Yes No   Sig: Take 3 mg by mouth daily at bedtime as needed   metFORMIN (GLUCOPHAGE) 1000 MG tablet  Outside Facility (Specify) Yes No   Sig: Take 1,000 mg by mouth 2 (two) times a day with meals   methenamine hippurate (HIPREX) 1 g tablet  Outside Facility (Specify) No No   Sig: Take 1 tablet (1 g total) by mouth 2 (two) times a day with meals   mirtazapine (REMERON) 7.5 MG  tablet  Outside Facility (Specify) Yes No   Sig: Take 7.5 mg by mouth daily at bedtime   pantoprazole (PROTONIX) 40 mg tablet  Outside Facility (Specify) Yes No   Sig: Take 40 mg by mouth daily   polyethylene glycol (MIRALAX) 17 g packet   No No   Sig: Take 17 g by mouth 2 (two) times a day   propranolol (INDERAL LA) 60 mg 24 hr capsule  Outside Facility (Specify) Yes No   Sig: Take 60 mg by mouth daily   senna-docusate sodium (SENOKOT S) 8.6-50 mg per tablet  Outside Facility (Specify) Yes No   Sig: Take 2 tablets by mouth daily at bedtime      Facility-Administered Medications: None       Past Medical History:   Diagnosis Date    Acute laryngitis     Acute nonsuppurative otitis media, unspecified laterality     Arm weakness     Arthritis     Basilar artery aneurysm (HCC)     Bladder infection     Bronchitis     Constipation     Cough     Diabetes (HCC)     Diabetes mellitus (HCC)     Dizziness     Dysfunction of eustachian tube     Erectile dysfunction of non-organic origin     Fatigue     Glaucoma     Hiatal hernia     Hypertension     Imbalance     Leg muscle spasm     MS (multiple sclerosis) (HCC)     Multiple sclerosis (HCC)     Nephrolithiasis     Neurogenic bladder     No natural teeth     Sinus pain     Spinal stenosis     Strain of thoracic region     Stroke ([facility])     Suprapubic catheter ([facility])        Past Surgical History:   Procedure Laterality Date    APPENDECTOMY      BRAIN SURGERY      Coil placed in aneurysm    CEREBRAL ANEURYSM REPAIR      CYSTOSCOPY      CYSTOSCOPY      CYSTOSCOPY  06/11/2018    CYSTOSCOPY  01/15/2021    EYE SURGERY      transscleral cyclophotocoagulation noncontact YAG laser    IR SUPRAPUBIC CATHETER CHECK/CHANGE/REINSERTION/UPSIZE  3/28/2024    MYRINGOTOMY      with ventilation tube insertion    RI LITHOLAPAXY SMPL/SM <2.5 CM N/A 5/7/2019    Procedure: CYSTOSCOPY, holmium laser litholapaxy of bladder stones, EXCHANGE OF SP TUBE;  Surgeon: Javy Jeffries MD;  Location: [facility];   Service: Urology    SUPRAPUBIC CATHETER INSERTION         Family History   Problem Relation Age of Onset    Heart attack Mother     Stroke Mother     Heart attack Father     Anuerysm Father         In Stomach     Aneurysm Father      I have reviewed and agree with the history as documented.    E-Cigarette/Vaping    E-Cigarette Use Never User      E-Cigarette/Vaping Substances    Nicotine No     THC No     CBD No     Flavoring No     Other No     Unknown No      Social History     Tobacco Use    Smoking status: Former     Current packs/day: 0.00     Average packs/day: 0.5 packs/day for 31.0 years (15.5 ttl pk-yrs)     Types: Cigarettes     Start date:      Quit date:      Years since quittin.5    Smokeless tobacco: Never   Vaping Use    Vaping status: Never Used   Substance Use Topics    Alcohol use: Not Currently    Drug use: No       Review of Systems   Constitutional:  Positive for diaphoresis. Negative for fatigue and fever.   HENT:  Positive for congestion.    Respiratory:  Positive for cough, chest tightness and shortness of breath. Negative for wheezing and stridor.    Cardiovascular:  Positive for chest pain. Negative for palpitations.   All other systems reviewed and are negative.      Physical Exam  Physical Exam  Vitals and nursing note reviewed.   Constitutional:       General: He is not in acute distress.     Appearance: Normal appearance. He is well-developed. He is not ill-appearing, toxic-appearing or diaphoretic.   HENT:      Head: Normocephalic and atraumatic.      Nose: Congestion present.      Mouth/Throat:      Pharynx: No oropharyngeal exudate or posterior oropharyngeal erythema.   Eyes:      General: No scleral icterus.        Right eye: No discharge.         Left eye: No discharge.      Extraocular Movements: Extraocular movements intact.      Conjunctiva/sclera: Conjunctivae normal.      Pupils: Pupils are equal, round, and reactive to light.   Cardiovascular:      Rate and  Rhythm: Normal rate and regular rhythm.      Pulses: Normal pulses.      Heart sounds: Normal heart sounds. No murmur heard.     No friction rub. No gallop.   Pulmonary:      Effort: Pulmonary effort is normal. No respiratory distress.      Breath sounds: No stridor. Rales present. No wheezing or rhonchi.   Abdominal:      Palpations: Abdomen is soft.      Tenderness: There is no abdominal tenderness.   Musculoskeletal:         General: No swelling.      Cervical back: Neck supple.      Right lower leg: No edema.      Left lower leg: No edema.   Skin:     General: Skin is warm and dry.      Capillary Refill: Capillary refill takes less than 2 seconds.      Coloration: Skin is not jaundiced.      Findings: No rash.   Neurological:      Mental Status: He is alert.   Psychiatric:         Mood and Affect: Mood normal.         Vital Signs  ED Triage Vitals   Temperature Pulse Respirations Blood Pressure SpO2   06/27/24 0305 06/27/24 0305 06/27/24 0305 06/27/24 0305 06/27/24 0305   98.2 °F (36.8 °C) 94 18 134/61 94 %      Temp Source Heart Rate Source Patient Position - Orthostatic VS BP Location FiO2 (%)   06/27/24 0305 06/27/24 0545 06/27/24 0305 06/27/24 0305 --   Oral Monitor Lying Right arm       Pain Score       --                  Vitals:    06/27/24 0545 06/27/24 0600 06/27/24 0630 06/27/24 0700   BP: 123/58 138/65 159/69 141/67   Pulse: 79 80 85 84   Patient Position - Orthostatic VS: Lying Lying Lying Lying         Visual Acuity      ED Medications  Medications   azithromycin (ZITHROMAX) tablet 500 mg (500 mg Oral Given 6/27/24 0640)   amoxicillin-clavulanate (AUGMENTIN) 875-125 mg per tablet 1 tablet (1 tablet Oral Given 6/27/24 0641)       Diagnostic Studies  Results Reviewed       Procedure Component Value Units Date/Time    HS Troponin I 2hr [[id number]]  (Normal) Collected: 06/27/24 0536    Lab Status: Final result Specimen: Blood from Arm, Left Updated: 06/27/24 0607     hs TnI 2hr 5 ng/L      Delta 2hr  hsTnI 1 ng/L     Basic metabolic panel [[id number]]  (Abnormal) Collected: 06/27/24 0433    Lab Status: Final result Specimen: Blood from Arm, Left Updated: 06/27/24 0452     Sodium 131 mmol/L      Potassium 4.5 mmol/L      Chloride 102 mmol/L      CO2 21 mmol/L      ANION GAP 8 mmol/L      BUN 16 mg/dL      Creatinine 0.95 mg/dL      Glucose 222 mg/dL      Calcium 9.7 mg/dL      eGFR 78 ml/min/1.73sq m     Narrative:      National Kidney Disease Foundation guidelines for Chronic Kidney Disease (CKD):     Stage 1 with normal or high GFR (GFR > 90 mL/min/1.73 square meters)    Stage 2 Mild CKD (GFR = 60-89 mL/min/1.73 square meters)    Stage 3A Moderate CKD (GFR = 45-59 mL/min/1.73 square meters)    Stage 3B Moderate CKD (GFR = 30-44 mL/min/1.73 square meters)    Stage 4 Severe CKD (GFR = 15-29 mL/min/1.73 square meters)    Stage 5 End Stage CKD (GFR <15 mL/min/1.73 square meters)  Note: GFR calculation is accurate only with a steady state creatinine    FLU/RSV/COVID - if FLU/RSV clinically relevant [[id number]]  (Normal) Collected: 06/27/24 0331    Lab Status: Final result Specimen: Nares from Nose Updated: 06/27/24 0414     SARS-CoV-2 Negative     INFLUENZA A PCR Negative     INFLUENZA B PCR Negative     RSV PCR Negative    Narrative:      FOR PEDIATRIC PATIENTS - copy/paste COVID Guidelines URL to browser: [email or web address]    SARS-CoV-2 assay is a Nucleic Acid Amplification assay intended for the  qualitative detection of nucleic acid from SARS-CoV-2 in nasopharyngeal  swabs. Results are for the presumptive identification of SARS-CoV-2 RNA.    Positive results are indicative of infection with SARS-CoV-2, the virus  causing COVID-19, but do not rule out bacterial infection or co-infection  with other viruses. Laboratories within the [place] and its  territories are required to report all positive results to the appropriate  public health authorities.  Negative results do not preclude SARS-CoV-2  infection and should not be used as the sole basis for treatment or other  patient management decisions. Negative results must be combined with  clinical observations, patient history, and epidemiological information.  This test has not been FDA cleared or approved.    This test has been authorized by FDA under an Emergency Use Authorization  (EUA). This test is only authorized for the duration of time the  declaration that circumstances exist justifying the authorization of the  emergency use of an in vitro diagnostic tests for detection of SARS-CoV-2  virus and/or diagnosis of COVID-19 infection under section 564(b)(1) of  the Act, 21 U.S.C. 360bbb-3(b)(1), unless the authorization is terminated  or revoked sooner. The test has been validated but independent review by FDA  and CLIA is pending.    Test performed using [employer]: This RT-PCR assay targets N2,  a region unique to SARS-CoV-2. A conserved region in the E-gene was chosen  for pan-Sarbecovirus detection which includes SARS-CoV-2.    According to CMS-2020-01-R, this platform meets the definition of high-throughput technology.    HS Troponin 0hr (reflex protocol) [[id number]]  (Normal) Collected: 06/27/24 0331    Lab Status: Final result Specimen: Blood from Arm, Left Updated: 06/27/24 0401     hs TnI 0hr 4 ng/L     Comprehensive metabolic panel [[id number]]  (Abnormal) Collected: 06/27/24 0331    Lab Status: Final result Specimen: Blood from Arm, Left Updated: 06/27/24 0400     Sodium 130 mmol/L      Potassium 5.6 mmol/L      Chloride 102 mmol/L      CO2 20 mmol/L      ANION GAP 8 mmol/L      BUN 16 mg/dL      Creatinine 0.98 mg/dL      Glucose 213 mg/dL      Calcium 9.8 mg/dL      AST 21 U/L      ALT 16 U/L      Alkaline Phosphatase 94 U/L      Total Protein 7.6 g/dL      Albumin 3.6 g/dL      Total Bilirubin 0.34 mg/dL      eGFR 75 ml/min/1.73sq m     Narrative:      National Kidney Disease Foundation  guidelines for Chronic Kidney Disease (CKD):     Stage 1 with normal or high GFR (GFR > 90 mL/min/1.73 square meters)    Stage 2 Mild CKD (GFR = 60-89 mL/min/1.73 square meters)    Stage 3A Moderate CKD (GFR = 45-59 mL/min/1.73 square meters)    Stage 3B Moderate CKD (GFR = 30-44 mL/min/1.73 square meters)    Stage 4 Severe CKD (GFR = 15-29 mL/min/1.73 square meters)    Stage 5 End Stage CKD (GFR <15 mL/min/1.73 square meters)  Note: GFR calculation is accurate only with a steady state creatinine    CBC and differential [[id number]]  (Abnormal) Collected: 06/27/24 0331    Lab Status: Final result Specimen: Blood from Arm, Left Updated: 06/27/24 0338     WBC 13.24 Thousand/uL      RBC 4.74 Million/uL      Hemoglobin 13.6 g/dL      Hematocrit 40.4 %      MCV 85 fL      MCH 28.7 pg      MCHC 33.7 g/dL      RDW 13.5 %      MPV 8.4 fL      Platelets 347 Thousands/uL      nRBC 0 /100 WBCs      Segmented % 57 %      Immature Grans % 0 %      Lymphocytes % 28 %      Monocytes % 8 %      Eosinophils Relative 6 %      Basophils Relative 1 %      Absolute Neutrophils 7.60 Thousands/µL      Absolute Immature Grans 0.03 Thousand/uL      Absolute Lymphocytes 3.65 Thousands/µL      Absolute Monocytes 0.99 Thousand/µL      Eosinophils Absolute 0.85 Thousand/µL      Basophils Absolute 0.12 Thousands/µL                    XR chest 2 views   Final Result by Zeeshan Bean MD (06/27 0945)      No acute cardiopulmonary disease.            Workstation performed: [id number]                    Procedures  ECG 12 Lead Documentation Only    Date/Time: 6/27/2024 3:06 AM    Performed by: Chilo Kaur PA-C  Authorized by: Chilo Kaur PA-C    Indications / Diagnosis:  Chest pain, sob  Patient location:  ED  Previous ECG:     Previous ECG:  Compared to current    Similarity:  No change  Interpretation:     Interpretation: normal    Rate:     ECG rate:  96    ECG rate assessment: normal    Rhythm:     Rhythm: sinus rhythm     Ectopy:     Ectopy: PVCs      PVCs:  Infrequent  QRS:     QRS axis:  Normal    QRS intervals:  Normal  Conduction:     Conduction: normal    ST segments:     ST segments:  Normal  T waves:     T waves: normal             ED Course                               SBIRT 20yo+      Flowsheet Row Most Recent Value   Initial Alcohol Screen: US AUDIT-C     1. How often do you have a drink containing alcohol? 0 Filed at: 06/27/2024 0312   2. How many drinks containing alcohol do you have on a typical day you are drinking?  0 Filed at: 06/27/2024 0312   3b. FEMALE Any Age, or MALE 65+: How often do you have 4 or more drinks on one occassion? 0 Filed at: 06/27/2024 0312   Audit-C Score 0 Filed at: 06/27/2024 0312   ALPESH: How many times in the past year have you...    Used an illegal drug or used a prescription medication for non-medical reasons? Never Filed at: 06/27/2024 0312                      Medical Decision Making  76-year-old male presents to ED for evaluation of shortness of breath, cough, nasal congestion, chest tightness, chest pain as above.  On physical examination patient vital signs stable.  Alert responding to questions appropriately.  Nasal congestion present.  No murmur. Mild rales on lung auscultation.  Extremities well-perfused.  Nonhypoxic.  Nontoxic-appearing.  Obtained workup consisting of CBC, CMP, troponin, flu/RSV/COVID swab, EKG, chest x-ray.    CBC returned demonstrating WBC of 13.24.  Mild leukocytosis.  CMP returned demonstrating potassium of 5.6.  Sample hemolyzed.  Ordered repeat BMP which returned with normal potassium.  Troponin of 4 ng/L.  2-hour troponin of 5 ng/L.  Negative flu/RSV/COVID.  EKG without evidence of acute cardiac injury, arrhythmia.  Chest x-ray with questionable pneumonia on lateral view on personal interpretation.     Discussed results of workup with patient.  Feel patient stable for discharge.  Plan to provide prescription for Augmentin, azithromycin to provide coverage  for possible pneumonia.  Advised patient to follow-up with primary care provider.  Certainly should return to ED for any new or worsening symptoms.  Patient in agreement with plan.  Patient discharged.     Prior to discharge, discharge instructions were discussed with patient at bedside. Patient was provided both verbal and written instructions. Patient is understanding of the discharge instructions and is agreeable to plan of care. Return precautions were discussed with patient bedside, patient verbalized understanding of signs and symptoms that would necessitate return to the ED. All questions were answered. Patient was comfortable with the plan of care and discharged to home.     Amount and/or Complexity of Data Reviewed  Labs: ordered.  Radiology: ordered.    Risk  Prescription drug management.             Disposition  Final diagnoses:   Cough   Nasal congestion   Chest pain   Chest tightness     Time reflects when diagnosis was documented in both MDM as applicable and the Disposition within this note       Time User Action Codes Description Comment    6/27/2024  6:36 AM Chilo Kaur Add [R05.9] Cough     6/27/2024  6:36 AM Chilo Kaur Add [R09.81] Nasal congestion     6/27/2024  6:37 AM Chilo Kaur Add [R07.9] Chest pain     6/27/2024  6:37 AM Chilo Kaur Add [R07.89] Chest tightness           ED Disposition       ED Disposition   Discharge    Condition   Stable    Date/Time   Thu Jun 27, 2024 0636    Comment   Mathew Rico discharge to home/self care.                   Follow-up Information       Follow up With Specialties Details Why Contact Info    Carolina Kumari MD Palliative Care   [address] [place] [address] [id number] [phone]              Discharge Medication List as of 6/27/2024  6:39 AM        START taking these medications    Details   amoxicillin-clavulanate (AUGMENTIN) 875-125 mg per tablet Take 1 tablet by mouth every 12 (twelve) hours for 7 days, Starting Thu 6/27/2024,  Until Thu 7/4/2024, Normal      azithromycin (ZITHROMAX) 250 mg tablet Take 1 tablet per day for 4 days., Normal           CONTINUE these medications which have NOT CHANGED    Details   !! acetaminophen (TYLENOL) 325 mg tablet Take 2 tablets (650 mg total) by mouth every 6 (six) hours as needed for mild pain, Starting Fri 3/29/2024, No Print      !! acetaminophen (TYLENOL) 325 mg tablet Take 3 tablets (975 mg total) by mouth every 8 (eight) hours, Starting Tue 4/2/2024, No Print      albuterol (2.5 mg/3 mL) 0.083 % nebulizer solution Take 3 mL (2.5 mg total) by nebulization every 6 (six) hours as needed for wheezing or shortness of breath, Starting Tue 10/3/2023, Normal      amLODIPine-atorvastatin (CADUET) 10-80 MG per tablet Take 1 tablet by mouth daily, Historical Med      baclofen 10 mg tablet Take 5 mg by mouth 3 (three) times a day, Historical Med      bisacodyl (DULCOLAX) 10 mg suppository Insert 10 mg into the rectum daily as needed for constipation, Historical Med      budesonide-formoterol (SYMBICORT) 160-4.5 mcg/act inhaler Inhale 2 puffs 2 (two) times a day Rinse mouth after use., Historical Med      clopidogrel (PLAVIX) 75 mg tablet Take 1 tablet (75 mg total) by mouth daily, Starting Sat 10/27/2018, No Print      cromolyn (NASALCHROM) 5.2 MG/ACT nasal spray 1 spray into each nostril 3 (three) times a day, Starting Tue 6/18/2024, Normal      cyanocobalamin (VITAMIN B-12) 500 MCG tablet Take 1 tablet (500 mcg total) by mouth daily, Starting Tue 4/2/2024, No Print      Empagliflozin (JARDIANCE) 10 MG TABS tablet Take 10 mg by mouth every morning, Historical Med      Ergocalciferol (VITAMIN D2 PO) Take 50,000 Units by mouth once a week, Historical Med      !! gabapentin (NEURONTIN) 300 mg capsule Take 1 capsule (300 mg total) by mouth 2 (two) times a day, Starting u 6/3/2021, Normal      !! gabapentin (NEURONTIN) 300 mg capsule Take 2 capsules (600 mg total) by mouth daily at bedtime, Starting Thu  6/3/2021, Normal      latanoprost (XALATAN) 0.005 % ophthalmic solution Administer 1 drop to both eyes daily at bedtime, Historical Med      lidocaine (LIDODERM) 5 % Apply 1 patch topically over 12 hours daily Remove & Discard patch within 12 hours or as directed by MD, Starting Wed 4/3/2024, No Print      melatonin 3 mg Take 3 mg by mouth daily at bedtime as needed, Historical Med      metFORMIN (GLUCOPHAGE) 1000 MG tablet Take 1,000 mg by mouth 2 (two) times a day with meals, Historical Med      methenamine hippurate (HIPREX) 1 g tablet Take 1 tablet (1 g total) by mouth 2 (two) times a day with meals, Starting Wed 3/27/2024, Normal      mirtazapine (REMERON) 7.5 MG tablet Take 7.5 mg by mouth daily at bedtime, Historical Med      pantoprazole (PROTONIX) 40 mg tablet Take 40 mg by mouth daily, Historical Med      polyethylene glycol (MIRALAX) 17 g packet Take 17 g by mouth 2 (two) times a day, Starting Tue 6/18/2024, Normal      propranolol (INDERAL LA) 60 mg 24 hr capsule Take 60 mg by mouth daily, Historical Med      senna-docusate sodium (SENOKOT S) 8.6-50 mg per tablet Take 2 tablets by mouth daily at bedtime, Historical Med       !! - Potential duplicate medications found. Please discuss with provider.          No discharge procedures on file.    PDMP Review         Value Time User    PDMP Reviewed  Yes 4/1/2024  3:21 AM Ar Beltran DO            ED Provider  Electronically Signed by             Chilo Kaur PA-C  06/29/24 2019     19-Sep-2017 18:00

## 2024-08-06 ENCOUNTER — APPOINTMENT (OUTPATIENT)
Dept: PULMONOLOGY | Facility: CLINIC | Age: 79
End: 2024-08-06

## 2024-08-06 PROCEDURE — 99214 OFFICE O/P EST MOD 30 MIN: CPT

## 2024-08-06 NOTE — HISTORY OF PRESENT ILLNESS
[TextBox_4] : 77 yo man with COPD , last here in May 23 s/pCABG , AICD and PPM, s/p AAA stenting, s/p femfem bypass On Entresto, metoprolol asa plavix, statin atorvastatin, allopurinol tamsulosin Takes stiolto regularly, using albuterol vis nebs at least twice a day Again, comes in without oxygen  Since last, seen, he reports that Dr Meza arranged for cath at [facility] which showed no changes Patient is in good spirits and has no issues

## 2024-08-06 NOTE — ASSESSMENT
[FreeTextEntry1] : 77 yo man with COPD , last here in May 23 s/pCABG , AICD and PPM, s/p AAA stenting, s/p femfem bypass On Entresto, metoprolol asa plavix, statin atorvastatin, allopurinol tamsulosin Takes stiolto regularly, using albuterol vis nebs at least twice a day Again, comes in without oxygen  Since last, seen, he reports that Dr Meza arranged for cath at [facility] which showed no changes Patient is in good spirits and has no issues  Imp 77 yo man with ASHD, AICD and PVD on entresto COPD on stiolto and albuterol recommend maintain meds and return in six months or earlier as necessary

## 2024-08-06 NOTE — CONSULT LETTER
[Dear  ___] : Dear  [unfilled], [FreeTextEntry1] : I had the pleasure of evaluating your patient, JUNIOR PAUL , in the office today.  Please review my consultation and evaluation report that follows below.  Please do not hesitate to call me if further information is necessary or if you wish to discuss ongoing care or diagnostic work-up.    I very much appreciate your referral and it is a privilege to be able to provide care for your patient.  Sincerely,   Ted Barr MD, MHCM, FACP, BARRY-C Pulmonary Medicine  of Medicine Don maura Khan [facility] [employer] [facility] at [facility] [email or web address] in [address] [place]  [address]    Fax   [facility] at [address]  [phone]

## 2024-08-06 NOTE — PHYSICAL EXAM
[No Acute Distress] : no acute distress [Normal Oropharynx] : normal oropharynx [Normal Appearance] : normal appearance [No Neck Mass] : no neck mass [Normal Rate/Rhythm] : normal rate/rhythm [Normal S1, S2] : normal s1, s2 [No Murmurs] : no murmurs [No Resp Distress] : no resp distress [Clear to Auscultation Bilaterally] : clear to auscultation bilaterally [No Abnormalities] : no abnormalities [Benign] : benign [Normal Gait] : normal gait [No Clubbing] : no clubbing [No Cyanosis] : no cyanosis [No Edema] : no edema [FROM] : FROM [Normal Color/ Pigmentation] : normal color/ pigmentation [No Focal Deficits] : no focal deficits [Oriented x3] : oriented x3 [Normal Affect] : normal affect [TextBox_2] : 02 sat 99% at rest RA

## 2024-10-18 NOTE — DISCHARGE NOTE ADULT - MEDICATION SUMMARY - MEDICATIONS TO CHANGE
I will SWITCH the dose or number of times a day I take the medications listed below when I get home from the hospital:  None
n/a

## 2025-02-11 ENCOUNTER — APPOINTMENT (OUTPATIENT)
Dept: PULMONOLOGY | Facility: CLINIC | Age: 80
End: 2025-02-11
Payer: MEDICARE

## 2025-02-11 VITALS
SYSTOLIC BLOOD PRESSURE: 128 MMHG | WEIGHT: 120 LBS | DIASTOLIC BLOOD PRESSURE: 76 MMHG | HEART RATE: 86 BPM | BODY MASS INDEX: 19.99 KG/M2 | TEMPERATURE: 97.3 F | HEIGHT: 65 IN | OXYGEN SATURATION: 91 %

## 2025-02-11 DIAGNOSIS — J44.9 CHRONIC OBSTRUCTIVE PULMONARY DISEASE, UNSPECIFIED: ICD-10-CM

## 2025-02-11 DIAGNOSIS — R09.02 HYPOXEMIA: ICD-10-CM

## 2025-02-11 PROCEDURE — 99214 OFFICE O/P EST MOD 30 MIN: CPT

## 2025-07-08 NOTE — H&P PST ADULT - NSANTHBMIRD_ENT_A_CORE
[Work-up necessary to assess local, regional or metastatic recurrence] : Work-up necessary to assess local, regional or metastatic recurrence No

## 2025-08-19 ENCOUNTER — APPOINTMENT (OUTPATIENT)
Dept: PULMONOLOGY | Facility: CLINIC | Age: 80
End: 2025-08-19
Payer: MEDICARE

## 2025-08-19 VITALS
HEIGHT: 65 IN | DIASTOLIC BLOOD PRESSURE: 71 MMHG | TEMPERATURE: 97.9 F | WEIGHT: 120 LBS | OXYGEN SATURATION: 93 % | BODY MASS INDEX: 19.99 KG/M2 | HEART RATE: 85 BPM | SYSTOLIC BLOOD PRESSURE: 119 MMHG

## 2025-08-19 DIAGNOSIS — J44.9 CHRONIC OBSTRUCTIVE PULMONARY DISEASE, UNSPECIFIED: ICD-10-CM

## 2025-08-19 DIAGNOSIS — R09.02 HYPOXEMIA: ICD-10-CM

## 2025-08-19 PROCEDURE — 99214 OFFICE O/P EST MOD 30 MIN: CPT

## 2025-08-19 RX ORDER — FLUTICASONE PROPIONATE 50 UG/1
50 SPRAY NASAL DAILY
Qty: 1 | Refills: 2 | Status: ACTIVE | COMMUNITY
Start: 2025-08-19 | End: 1900-01-01